# Patient Record
Sex: FEMALE | Race: BLACK OR AFRICAN AMERICAN | Employment: OTHER | URBAN - METROPOLITAN AREA
[De-identification: names, ages, dates, MRNs, and addresses within clinical notes are randomized per-mention and may not be internally consistent; named-entity substitution may affect disease eponyms.]

---

## 2017-03-17 ENCOUNTER — HOSPITAL ENCOUNTER (OUTPATIENT)
Dept: MAMMOGRAPHY | Age: 67
Discharge: HOME | End: 2017-03-17
Attending: FAMILY MEDICINE
Payer: COMMERCIAL

## 2017-03-17 DIAGNOSIS — Z12.31 SCREENING MAMMOGRAM, ENCOUNTER FOR: ICD-10-CM

## 2017-03-17 PROCEDURE — 77067 SCR MAMMO BI INCL CAD: CPT

## 2017-04-10 ENCOUNTER — HOSPITAL ENCOUNTER (EMERGENCY)
Age: 67
Discharge: HOME | End: 2017-04-10
Payer: COMMERCIAL

## 2017-04-10 ENCOUNTER — APPOINTMENT (OUTPATIENT)
Dept: GENERAL RADIOLOGY | Age: 67
End: 2017-04-10
Payer: COMMERCIAL

## 2017-04-10 ENCOUNTER — APPOINTMENT (OUTPATIENT)
Dept: CT IMAGING | Age: 67
End: 2017-04-10
Payer: COMMERCIAL

## 2017-04-10 VITALS
BODY MASS INDEX: 31.39 KG/M2 | HEART RATE: 96 BPM | WEIGHT: 200 LBS | OXYGEN SATURATION: 96 % | HEIGHT: 67 IN | DIASTOLIC BLOOD PRESSURE: 62 MMHG | SYSTOLIC BLOOD PRESSURE: 112 MMHG | RESPIRATION RATE: 18 BRPM

## 2017-04-10 DIAGNOSIS — R42 VERTIGO: Primary | ICD-10-CM

## 2017-04-10 LAB
ALBUMIN SERPL BCP-MCNC: 3.5 G/DL (ref 3.2–4.6)
ALBUMIN/GLOB SERPL: 0.9 {RATIO} (ref 1.2–3.5)
ALP SERPL-CCNC: 106 U/L (ref 50–136)
ALT SERPL-CCNC: 28 U/L (ref 12–65)
ANION GAP BLD CALC-SCNC: 11 MMOL/L (ref 7–16)
AST SERPL W P-5'-P-CCNC: 21 U/L (ref 15–37)
ATRIAL RATE: 73 BPM
BASOPHILS # BLD AUTO: 0 K/UL (ref 0–0.2)
BASOPHILS # BLD: 0 % (ref 0–2)
BILIRUB SERPL-MCNC: 0.3 MG/DL (ref 0.2–1.1)
BUN SERPL-MCNC: 8 MG/DL (ref 8–23)
CALCIUM SERPL-MCNC: 9.4 MG/DL (ref 8.3–10.4)
CALCULATED P AXIS, ECG09: 61 DEGREES
CALCULATED R AXIS, ECG10: 47 DEGREES
CALCULATED T AXIS, ECG11: 67 DEGREES
CHLORIDE SERPL-SCNC: 105 MMOL/L (ref 98–107)
CO2 SERPL-SCNC: 27 MMOL/L (ref 21–32)
CREAT SERPL-MCNC: 1.01 MG/DL (ref 0.6–1)
DIAGNOSIS, 93000: NORMAL
DIFFERENTIAL METHOD BLD: ABNORMAL
EOSINOPHIL # BLD: 0.1 K/UL (ref 0–0.8)
EOSINOPHIL NFR BLD: 1 % (ref 0.5–7.8)
ERYTHROCYTE [DISTWIDTH] IN BLOOD BY AUTOMATED COUNT: 13.6 % (ref 11.9–14.6)
GLOBULIN SER CALC-MCNC: 3.9 G/DL (ref 2.3–3.5)
GLUCOSE SERPL-MCNC: 213 MG/DL (ref 65–100)
HCT VFR BLD AUTO: 42.4 % (ref 35.8–46.3)
HGB BLD-MCNC: 13.4 G/DL (ref 11.7–15.4)
IMM GRANULOCYTES # BLD: 0 K/UL (ref 0–0.5)
IMM GRANULOCYTES NFR BLD AUTO: 0.4 % (ref 0–5)
LYMPHOCYTES # BLD AUTO: 37 % (ref 13–44)
LYMPHOCYTES # BLD: 2.9 K/UL (ref 0.5–4.6)
MAGNESIUM SERPL-MCNC: 1.8 MG/DL (ref 1.8–2.4)
MCH RBC QN AUTO: 29.9 PG (ref 26.1–32.9)
MCHC RBC AUTO-ENTMCNC: 31.6 G/DL (ref 31.4–35)
MCV RBC AUTO: 94.6 FL (ref 79.6–97.8)
MONOCYTES # BLD: 0.4 K/UL (ref 0.1–1.3)
MONOCYTES NFR BLD AUTO: 6 % (ref 4–12)
NEUTS SEG # BLD: 4.2 K/UL (ref 1.7–8.2)
NEUTS SEG NFR BLD AUTO: 56 % (ref 43–78)
P-R INTERVAL, ECG05: 146 MS
PLATELET # BLD AUTO: 223 K/UL (ref 150–450)
PMV BLD AUTO: 10.2 FL (ref 10.8–14.1)
POTASSIUM SERPL-SCNC: 3 MMOL/L (ref 3.5–5.1)
PROT SERPL-MCNC: 7.4 G/DL (ref 6.3–8.2)
Q-T INTERVAL, ECG07: 368 MS
QRS DURATION, ECG06: 80 MS
QTC CALCULATION (BEZET), ECG08: 405 MS
RBC # BLD AUTO: 4.48 M/UL (ref 4.05–5.25)
SODIUM SERPL-SCNC: 143 MMOL/L (ref 136–145)
VENTRICULAR RATE, ECG03: 73 BPM
WBC # BLD AUTO: 7.6 K/UL (ref 4.3–11.1)

## 2017-04-10 PROCEDURE — 80053 COMPREHEN METABOLIC PANEL: CPT

## 2017-04-10 PROCEDURE — 83735 ASSAY OF MAGNESIUM: CPT

## 2017-04-10 PROCEDURE — 70450 CT HEAD/BRAIN W/O DYE: CPT

## 2017-04-10 PROCEDURE — 96374 THER/PROPH/DIAG INJ IV PUSH: CPT

## 2017-04-10 PROCEDURE — 71010 XR CHEST PORT: CPT

## 2017-04-10 PROCEDURE — 93005 ELECTROCARDIOGRAM TRACING: CPT

## 2017-04-10 PROCEDURE — 99283 EMERGENCY DEPT VISIT LOW MDM: CPT

## 2017-04-10 PROCEDURE — 74011250636 HC RX REV CODE- 250/636

## 2017-04-10 PROCEDURE — 85025 COMPLETE CBC W/AUTO DIFF WBC: CPT

## 2017-04-10 RX ORDER — MECLIZINE HYDROCHLORIDE 25 MG/1
25 TABLET ORAL
Qty: 21 TAB | Refills: 0 | Status: SHIPPED | OUTPATIENT
Start: 2017-04-10 | End: 2017-04-20

## 2017-04-10 RX ORDER — DIAZEPAM 10 MG/2ML
5 INJECTION INTRAMUSCULAR
Status: DISCONTINUED | OUTPATIENT
Start: 2017-04-10 | End: 2017-04-10 | Stop reason: HOSPADM

## 2017-04-10 RX ORDER — MECLIZINE HYDROCHLORIDE 25 MG/1
50 TABLET ORAL
Status: COMPLETED | OUTPATIENT
Start: 2017-04-10 | End: 2017-04-10

## 2017-04-10 RX ADMIN — MECLIZINE HYDROCHLORIDE 50 MG: 25 TABLET ORAL at 01:43

## 2017-04-10 NOTE — ED PROVIDER NOTES
HPI Comments: 79-year-old female complaining of vertigo. Patient has a history of vertigo. Has had an episode for a while does not have any Antivert available. Patient is a 74 y.o. female presenting with dizziness. The history is provided by the patient. Dizziness   This is a recurrent problem. The current episode started 12 to 24 hours ago. The problem has not changed since onset. There was no focality noted. Pertinent negatives include no focal weakness, no agitation, no visual change, no mental status change, no unresponsiveness and no disorientation. There has been no fever. Associated symptoms include nausea. Pertinent negatives include no altered mental status, no confusion and no headaches. Associated medical issues do not include trauma or CVA. 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    Hx of abnormal mammogram     Hx of Doppler ultrasound     AAA, carotid normal 2014    Hx of echocardiogram 2014    Inverted nipple        Past Surgical History:   Procedure Laterality Date    HX HEMORRHOIDECTOMY      HX KNEE ARTHROSCOPY      HX TUBAL LIGATION           Family History:   Problem Relation Age of Onset    Hypertension Mother     Cancer Mother      Lung Cancer    Heart Disease Father     Hypertension Father     Breast Cancer Paternal Aunt     Breast Cancer Other        Social History     Social History    Marital status:      Spouse name: N/A    Number of children: 2    Years of education: N/A     Occupational History    Not on file.      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 Negative for activity change. HENT: Negative. Eyes: Negative. Respiratory: Negative. Cardiovascular: Negative. Gastrointestinal: Positive for nausea. Genitourinary: Negative. Musculoskeletal: Negative. Skin: Negative. Neurological: Positive for dizziness. Negative for focal weakness and headaches. Psychiatric/Behavioral: Negative. Negative for agitation and confusion. All other systems reviewed and are negative. Vitals:    04/10/17 0048 04/10/17 0050 04/10/17 0233   BP:  146/75 112/62   Pulse: 96     Resp: 18     SpO2: 96%  96%   Weight: 90.7 kg (200 lb)     Height: 5' 7\" (1.702 m)              Physical Exam   Constitutional: She is oriented to person, place, and time. She appears well-developed and well-nourished. No distress. HENT:   Head: Normocephalic and atraumatic. Right Ear: External ear normal.   Left Ear: External ear normal.   Nose: Nose normal.   Mouth/Throat: Oropharynx is clear and moist. No oropharyngeal exudate. Eyes: Conjunctivae are normal. Pupils are equal, round, and reactive to light. Right eye exhibits no discharge. Left eye exhibits no discharge. No scleral icterus. Right eye exhibits nystagmus. Left eye exhibits nystagmus. Neck: Normal range of motion. Neck supple. No JVD present. No tracheal deviation present. Cardiovascular: Normal rate, regular rhythm and intact distal pulses. Pulmonary/Chest: Effort normal and breath sounds normal. No stridor. No respiratory distress. She has no wheezes. She exhibits no tenderness. Abdominal: Soft. Bowel sounds are normal. She exhibits no distension and no mass. There is no tenderness. Musculoskeletal: Normal range of motion. She exhibits no edema or tenderness. Neurological: She is alert and oriented to person, place, and time. No cranial nerve deficit. Skin: Skin is warm and dry. No rash noted. She is not diaphoretic. No erythema.  No pallor. Psychiatric: She has a normal mood and affect. Her behavior is normal. Thought content normal.   Nursing note and vitals reviewed. MDM  Number of Diagnoses or Management Options  Vertigo: established and worsening  Diagnosis management comments: History of vertigo completely resolved with 50 of Antivert. We'll put her on Antivert follow-up with primary care physician this week.        Amount and/or Complexity of Data Reviewed  Clinical lab tests: ordered and reviewed  Tests in the radiology section of CPT®: ordered and reviewed  Tests in the medicine section of CPT®: ordered and reviewed      ED Course       Procedures

## 2017-04-10 NOTE — DISCHARGE INSTRUCTIONS
Dizziness: Care Instructions  Your Care Instructions  Dizziness is the feeling of unsteadiness or fuzziness in your head. It is different than having vertigo, which is a feeling that the room is spinning or that you are moving or falling. It is also different from lightheadedness, which is the feeling that you are about to faint. It can be hard to know what causes dizziness. Some people feel dizzy when they have migraine headaches. Sometimes bouts of flu can make you feel dizzy. Some medical conditions, such as heart problems or high blood pressure, can make you feel dizzy. Many medicines can cause dizziness, including medicines for high blood pressure, pain, or anxiety. If a medicine causes your symptoms, your doctor may recommend that you stop or change the medicine. If it is a problem with your heart, you may need medicine to help your heart work better. If there is no clear reason for your symptoms, your doctor may suggest watching and waiting for a while to see if the dizziness goes away on its own. Follow-up care is a key part of your treatment and safety. Be sure to make and go to all appointments, and call your doctor if you are having problems. It's also a good idea to know your test results and keep a list of the medicines you take. How can you care for yourself at home? · If your doctor recommends or prescribes medicine, take it exactly as directed. Call your doctor if you think you are having a problem with your medicine. · Do not drive while you feel dizzy. · Try to prevent falls. Steps you can take include:  ¨ Using nonskid mats, adding grab bars near the tub, and using night-lights. ¨ Clearing your home so that walkways are free of anything you might trip on. ¨ Letting family and friends know that you have been feeling dizzy. This will help them know how to help you. When should you call for help? Call 911 anytime you think you may need emergency care.  For example, call if:  · You passed out (lost consciousness). · You have dizziness along with symptoms of a heart attack. These may include:  ¨ Chest pain or pressure, or a strange feeling in the chest.  ¨ Sweating. ¨ Shortness of breath. ¨ Nausea or vomiting. ¨ Pain, pressure, or a strange feeling in the back, neck, jaw, or upper belly or in one or both shoulders or arms. ¨ Lightheadedness or sudden weakness. ¨ A fast or irregular heartbeat. · You have symptoms of a stroke. These may include:  ¨ Sudden numbness, tingling, weakness, or loss of movement in your face, arm, or leg, especially on only one side of your body. ¨ Sudden vision changes. ¨ Sudden trouble speaking. ¨ Sudden confusion or trouble understanding simple statements. ¨ Sudden problems with walking or balance. ¨ A sudden, severe headache that is different from past headaches. Call your doctor now or seek immediate medical care if:  · You feel dizzy and have a fever, headache, or ringing in your ears. · You have new or increased nausea and vomiting. · Your dizziness does not go away or comes back. Watch closely for changes in your health, and be sure to contact your doctor if:  · You do not get better as expected. Where can you learn more? Go to http://fabiola-stacy.info/. Enter L574 in the search box to learn more about \"Dizziness: Care Instructions. \"  Current as of: May 27, 2016  Content Version: 11.2  © 4741-7802 [employer]. Care instructions adapted under license by [employer] (which disclaims liability or warranty for this information). If you have questions about a medical condition or this instruction, always ask your healthcare professional. [place] [address] any warranty or liability for your use of this information. Vertigo: Care Instructions  Your Care Instructions  Vertigo is the feeling that you or your surroundings are moving when there is no actual movement.  It is often described as a feeling of spinning, whirling, falling, or tilting. Vertigo may make you vomit or feel nauseated. You may have trouble standing or walking and may lose your balance. Vertigo is often related to an inner ear problem, but it can have other more serious causes. If vertigo continues, you may need more tests to find its cause. Follow-up care is a key part of your treatment and safety. Be sure to make and go to all appointments, and call your doctor if you are having problems. Its also a good idea to know your test results and keep a list of the medicines you take. How can you care for yourself at home? · Do not lie flat on your back. Prop yourself up slightly. This may reduce the spinning feeling. Keep your eyes open. · Move slowly so that you do not fall. · If your doctor recommends medicine, take it exactly as directed. · Do not drive while you are having vertigo. Certain exercises, called Coombs-Daroff exercises, can help decrease vertigo. To do Coombs-Daroff exercises:  · Sit on the edge of a bed or sofa and quickly lie down on the side that causes the worst vertigo. Lie on your side with your ear down. · Stay in this position for at least 30 seconds or until the vertigo goes away. · Sit up. If this causes vertigo, wait for it to stop. · Repeat the procedure on the other side. · Repeat this 10 times. Do these exercises 2 times a day until the vertigo is gone. When should you call for help? Call 911 anytime you think you may need emergency care. For example, call if:  · You passed out (lost consciousness). · You have symptoms of a stroke. These may include:  ¨ Sudden numbness, tingling, weakness, or loss of movement in your face, arm, or leg, especially on only one side of your body. ¨ Sudden vision changes. ¨ Sudden trouble speaking. ¨ Sudden confusion or trouble understanding simple statements. ¨ Sudden problems with walking or balance.   ¨ A sudden, severe headache that is different from past headaches. Call your doctor now or seek immediate medical care if:  · Vertigo occurs with a fever, a headache, or ringing in your ears. · You have new or increased nausea and vomiting. Watch closely for changes in your health, and be sure to contact your doctor if:  · Vertigo gets worse or happens more often. · Vertigo has not gotten better after 2 weeks. Where can you learn more? Go to http://fabiola-stacy.info/. Enter T450 in the search box to learn more about \"Vertigo: Care Instructions. \"  Current as of: July 29, 2016  Content Version: 11.2  © 4899-9966 [employer]. Care instructions adapted under license by [employer] (which disclaims liability or warranty for this information). If you have questions about a medical condition or this instruction, always ask your healthcare professional. [address] any warranty or liability for your use of this information.

## 2017-04-11 ENCOUNTER — HOSPITAL ENCOUNTER (OUTPATIENT)
Dept: PHYSICAL THERAPY | Age: 67
End: 2017-04-11

## 2017-04-25 ENCOUNTER — HOSPITAL ENCOUNTER (OUTPATIENT)
Dept: PHYSICAL THERAPY | Age: 67
Discharge: HOME | End: 2017-04-25
Payer: MEDICARE

## 2017-04-25 DIAGNOSIS — M53.3 SACRO ILIAL PAIN: ICD-10-CM

## 2017-04-25 PROCEDURE — G8981 BODY POS CURRENT STATUS: HCPCS

## 2017-04-25 PROCEDURE — 97161 PT EVAL LOW COMPLEX 20 MIN: CPT

## 2017-04-25 PROCEDURE — G8982 BODY POS GOAL STATUS: HCPCS

## 2017-04-25 NOTE — PROGRESS NOTES
Ambulatory/Rehab Services H2 Model Falls Risk Assessment    Risk Factor Pts. ·   Confusion/Disorientation/Impulsivity  []    4 ·   Symptomatic Depression  []   2 ·   Altered Elimination  []   1 ·   Dizziness/Vertigo  []   1 ·   Gender (Male)  []   1 ·   Any administered antiepileptics (anticonvulsants):  []   2 ·   Any administered benzodiazepines:  []   1 ·   Visual Impairment (specify):  []   1 ·   Portable Oxygen Use  []   1 ·   Orthostatic ? BP  []   1 ·   History of Recent Falls (within 3 mos.)  []   5     Ability to Rise from Chair (choose one) Pts. ·   Ability to rise in a single movement  [x]   0 ·   Pushes up, successful in one attempt  []   1 ·   Multiple attempts, but successful  []   3 ·   Unable to rise without assistance  []   4   Total: (5 or greater = High Risk) 0     Falls Prevention Plan:   []                Physical Limitations to Exercise (specify):   []                Mobility Assistance Device (type):   []                Exercise/Equipment Adaptation (specify):    ©2010 [facility] [address] Patent #[id number].  Federal Law prohibits the replication, distribution or use without written permission from [facility] [employer]

## 2017-04-25 NOTE — PROGRESS NOTES
Libby Lowery  : 1950 [facility]  [address] [facility], [address] [place] [address],[address], [address] [facility]  [phone]   [phone]         OUTPATIENT PHYSICAL THERAPY:Initial Assessment 2017    ICD-10: Treatment Diagnosis: Low back pain (M54.5),  Sacro ilial pain (M53.3)  Precautions/Allergies: allergic to sulfa antibiotics  Fall Risk Score: 0 (? 5 = High Risk)  MD Orders: Evaluate and treat SI joint pain MEDICAL/REFERRING DIAGNOSIS:  Sacro ilial pain [M53.3]    DATE OF ONSET: over a year ago  REFERRING PHYSICIAN: Thien Velazquez MD  RETURN PHYSICIAN APPOINTMENT: None scheduled     INITIAL ASSESSMENT:  Ms. Lorna Villa is a 74year old female with complaints of pain in the L SI area that started over a year ago. She presents with constant pain which does not change much with position, decreased lumbar motion, and positive SI provocation tests. She may benefit from PT to address deficits and work toward goals. As pain has been present for over a year, recovery may be slow. PROBLEM LIST (Impacting functional limitations):  1. Increased Pain  2. Decreased Flexibility/Joint Mobility  3. SI dysfunction INTERVENTIONS PLANNED:  1. Home Exercise Program (HEP)  2. Manual Therapy  3. Therapeutic Exercise/Strengthening  4. Modals as needed   TREATMENT PLAN:  Effective Dates: 17 TO 17. Frequency/Duration: 1-2 times a week for 90 days  GOALS: (Goals have been discussed and agreed upon with patient.)  Patient's stated goal is to stop having pain  Short-Term Functional Goals: Time Frame: 6 weeks  1. Patient to be independent with HEP  2. Patient to report decrease in constant pain levels from 6 or 7, to 3 or 4  Discharge Goals: Time Frame: 90 days  1. Patient to report no more than minimal SI pain with all activity  2.  Patient to improve Oswestry score from 18 to 10 to demo improved functional mobility  Rehabilitation Potential For Stated Goals: Mariangel Amado Pool Clay's therapy, I certify that the treatment plan above will be carried out by a therapist or under their direction. Thank you for this referral,      Celsa Romero PT     Referring Physician Signature: Hetal Aldrich MD              Date                    The information in this section was collected on 4/25/17 (except where otherwise noted). HISTORY:   History of Present Injury/Illness (Reason for Referral): Insidious onset of pain in L SI area over a year ago. Sometimes pain goes across abdomen, but not always. Walking does not increase pain. Can sit in firm chair, but not soft ones. Pain may be getting slightly worse since it first started. 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 Current Medications:  Calcium with vitamin D  Date Last Reviewed:  4/25/17   Number of Personal Factors/Comorbidities that affect the Plan of Care: 0: LOW COMPLEXITY   EXAMINATION:   Observation/Orthostatic Postural Assessment:          Stands with increased lumbar lordosis. Palpation:         Pelvic land marks symmetrical in standing. ROM:          Lumbar extension, L and R rotation all 100%. Lumbar flexion about 50%, lateral flexion 75% to L and R  Strength:          WFL  Special Tests:          Positive standing forward bend test on L. Positive Carlton on L. Positive SI compression on L. Neurological Screen:        Sensation: light touch intact in B LEs   Body Structures Involved:  1. Joints  2. Muscles  3. Ligaments Body Functions Affected:  1. Neuromusculoskeletal  2. Movement Related Activities and Participation Affected:  1. Mobility  2. Self Care  3.  Community, Social and [place]   Number of elements (examined above) that affect the Plan of Care: 3: MODERATE COMPLEXITY   CLINICAL PRESENTATION:   Presentation: Stable and uncomplicated: LOW COMPLEXITY   CLINICAL DECISION MAKING:   Outcome Measure: Tool Used: Modified Oswestry Low Back Pain Questionnaire  Score:  Initial: 18/50  Most Recent: X/50 (Date: -- )   Interpretation of Score: Each section is scored on a 0-5 scale, 5 representing the greatest disability. The scores of each section are added together for a total score of 50. Score 0 1-10 11-20 21-30 31-40 41-49 50   Modifier CH CI CJ CK CL CM CN     ? Changing and Maintaining Body Position:    [id number] - CURRENT STATUS: CJ - 20%-39% impaired, limited or restricted    - GOAL STATUS: CI - 1%-19% impaired, limited or restricted    - D/C STATUS:  ---------------To be determined---------------    Medical Necessity:   · Patient demonstrates fair rehab potential due to higher previous functional level. Reason for Services/Other Comments:  · Patient continues to require skilled intervention due to need to return to higher level of function. Use of outcome tool(s) and clinical judgement create a POC that gives a: Questionable prediction of patient's progress: MODERATE COMPLEXITY            TREATMENT:   (In addition to Assessment/Re-Assessment sessions the following treatments were rendered)  Pre-treatment Symptoms/Complaints:  Pain which has lasted for over a year. Pain: Initial:   Pain Intensity 1: 7 (\"6 to 7\")   Post Session:  Patient noted that pain was slightly less at end of session, but she did not quantify the level. Therapeutic Exercise: ( ):  5 min - Exercises to improve mobility and posture. Required moderate visual and verbal cues to instruct in exercises. Progressed complexity of movement as indicated. Instructed patient in [address], standing posterior pelvic tilt, and SI joint stretch (knee to contralateral shoulder). Modalities - Ice x 5 minutes to SI area while PT printed HEP. She then took ice with her.      Treatment/Session Assessment:    · Response to Treatment:  Patient noted feeling slightly better after icing and she plans to try this at home as recommended by PT.  · Compliance with Program/Exercises: Will assess as treatment progresses. · Recommendations/Intent for next treatment session: \"Next visit will focus on decreasing pain, mobilizing SI\".   Total Treatment Duration: 57 minutes  PT Patient Time In/Time Out  Time In: 1300  Time Out: [address] Neumann

## 2017-05-02 ENCOUNTER — HOSPITAL ENCOUNTER (OUTPATIENT)
Dept: PHYSICAL THERAPY | Age: 67
Discharge: HOME | End: 2017-05-02
Payer: MEDICARE

## 2017-05-02 PROCEDURE — 97110 THERAPEUTIC EXERCISES: CPT

## 2017-05-02 NOTE — PROGRESS NOTES
Sunny Crocker  : 1950 [facility]  1101 [facility], [address] [place] [address],[address], [facility] [address] [facility].  [phone]   [phone]         OUTPATIENT PHYSICAL THERAPY:Daily Note 2017    ICD-10: Treatment Diagnosis: Low back pain (M54.5),  Sacro ilial pain (M53.3)  Precautions/Allergies: allergic to sulfa antibiotics  Fall Risk Score: 0 (? 5 = High Risk)  MD Orders: Evaluate and treat SI joint pain MEDICAL/REFERRING DIAGNOSIS:  sacro ilial pain    DATE OF ONSET: over a year ago  REFERRING PHYSICIAN: Ricky Escobar MD  RETURN PHYSICIAN APPOINTMENT: None scheduled     INITIAL ASSESSMENT:  Ms. Yee Wilson is a 74year old female with complaints of pain in the L SI area that started over a year ago. She presents with constant pain which does not change much with position, decreased lumbar motion, and positive SI provocation tests. She may benefit from PT to address deficits and work toward goals. As pain has been present for over a year, recovery may be slow. PROBLEM LIST (Impacting functional limitations):  1. Increased Pain  2. Decreased Flexibility/Joint Mobility  3. SI dysfunction INTERVENTIONS PLANNED:  1. Home Exercise Program (HEP)  2. Manual Therapy  3. Therapeutic Exercise/Strengthening  4. Modals as needed   TREATMENT PLAN:  Effective Dates: 17 TO 17. Frequency/Duration: 1-2 times a week for 90 days  GOALS: (Goals have been discussed and agreed upon with patient.)  Patient's stated goal is to stop having pain  Short-Term Functional Goals: Time Frame: 6 weeks  1. Patient to be independent with HEP  2. Patient to report decrease in constant pain levels from 6 or 7, to 3 or 4  Discharge Goals: Time Frame: 90 days  1. Patient to report no more than minimal SI pain with all activity  2.  Patient to improve Oswestry score from 18 to 10 to demo improved functional mobility  Rehabilitation Potential For Stated Goals: [address] [place] [address],  The information in this section was collected on 4/25/17 (except where otherwise noted). HISTORY:   History of Present Injury/Illness (Reason for Referral): Insidious onset of pain in L SI area over a year ago. Sometimes pain goes across abdomen, but not always. Walking does not increase pain. Can sit in firm chair, but not soft ones. Pain may be getting slightly worse since it first started. 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 Current Medications:  Calcium with vitamin D  Date Last Reviewed:  5/2/17   EXAMINATION:   Observation/Orthostatic Postural Assessment:          Stands with increased lumbar lordosis. Palpation:         Pelvic land marks symmetrical in standing. ROM:          Lumbar extension, L and R rotation all 100%. Lumbar flexion about 50%, lateral flexion 75% to L and R  Strength:          WFL  Special Tests:          Positive standing forward bend test on L. Positive Carlton on L. Positive SI compression on L. Neurological Screen:        Sensation: light touch intact in B LEs   Body Structures Involved:  1. Joints  2. Muscles  3. Ligaments Body Functions Affected:  1. Neuromusculoskeletal  2. Movement Related Activities and Participation Affected:  1. Mobility  2. Self Care  3. Community, Social and Civic Life   CLINICAL DECISION MAKING:   Outcome Measure: Tool Used: Modified Oswestry Low Back Pain Questionnaire  Score:  Initial: 18/50  Most Recent: X/50 (Date: -- )   Interpretation of Score: Each section is scored on a 0-5 scale, 5 representing the greatest disability. The scores of each section are added together for a total score of 50. Score 0 1-10 11-20 21-30 31-40 41-49 50   Modifier CH CI CJ CK CL CM CN     ?  Changing and Maintaining Body Position:    [id number] - CURRENT STATUS: CJ - 20%-39% impaired, limited or restricted    - GOAL STATUS: CI - 1%-19% impaired, limited or restricted    - D/C STATUS:  ---------------To be determined---------------    Medical Necessity:   · Patient demonstrates fair rehab potential due to higher previous functional level. Reason for Services/Other Comments:  · Patient continues to require skilled intervention due to need to return to higher level of function. TREATMENT:   (In addition to Assessment/Re-Assessment sessions the following treatments were rendered)  Pre-treatment Symptoms/Complaints:  Patient reports that she is significantly better. Icing and stretching have helped. Reports compliance with HEP and she has also tried to avoid sitting with her legs crossed. Pain: Initial:   Pain Intensity 1: 3   Post Session:  Patient noted that pain was slightly more at end of session, but she did not quantify the level. She will ice at home. Therapeutic Exercise: (25 Minutes):   Exercises to improve mobility and posture. Required moderate visual and verbal cues to ensure correct performance. Progressed complexity of movement as indicated. Date:  5/2/17 Date:   Date:     Activity/Exercise Parameters Parameters Parameters   SKTC B x 10      SI joint stretch B x 10     DKTC x5     Supine pelvic tilt x10     bridging 1x10     Trunk flex with ball 1x10     Standing pelvic tilt x10     Hip abductor squeeze 5 sec x 10                           Modalities - patient declined ice - will do at home. Treatment/Session Assessment:    · Response to Treatment:  Patient noted being a little more sore after exercises, but better than she was last week. · Compliance with Program/Exercises: yes  · Recommendations/Intent for next treatment session: \"Next visit will focus on decreasing pain, mobilizing SI\".   Total Treatment Duration: 25 minutes  PT Patient Time In/Time Out  Time In: 0820  Time Out: [facility] , PT

## 2017-05-08 ENCOUNTER — HOSPITAL ENCOUNTER (OUTPATIENT)
Dept: PHYSICAL THERAPY | Age: 67
Discharge: HOME | End: 2017-05-08
Payer: MEDICARE

## 2017-05-08 PROCEDURE — 97110 THERAPEUTIC EXERCISES: CPT

## 2017-05-08 NOTE — PROGRESS NOTES
Delora Dance  : 1950 Therapy Center at [facility] BR[facility]NA [facility]  1101 [facility], [address] [place] [address],[address], [place] [address].  [phone]   [phone]         OUTPATIENT PHYSICAL THERAPY:Daily Note 2017    ICD-10: Treatment Diagnosis: Low back pain (M54.5),  Sacro ilial pain (M53.3)  Precautions/Allergies: allergic to sulfa antibiotics  Fall Risk Score: 0 (? 5 = High Risk)  MD Orders: Evaluate and treat SI joint pain MEDICAL/REFERRING DIAGNOSIS:  sacro ilial pain    DATE OF ONSET: over a year ago  REFERRING PHYSICIAN: Bing Cisse MD  RETURN PHYSICIAN APPOINTMENT: None scheduled     INITIAL ASSESSMENT:  Ms. Cassie Monroe is a 74year old female with complaints of pain in the L SI area that started over a year ago. She presents with constant pain which does not change much with position, decreased lumbar motion, and positive SI provocation tests. She may benefit from PT to address deficits and work toward goals. As pain has been present for over a year, recovery may be slow. PROBLEM LIST (Impacting functional limitations):  1. Increased Pain  2. Decreased Flexibility/Joint Mobility  3. SI dysfunction INTERVENTIONS PLANNED:  1. Home Exercise Program (HEP)  2. Manual Therapy  3. Therapeutic Exercise/Strengthening  4. Modals as needed   TREATMENT PLAN:  Effective Dates: 17 TO 17. Frequency/Duration: 1-2 times a week for 90 days  GOALS: (Goals have been discussed and agreed upon with patient.)  Patient's stated goal is to stop having pain  Short-Term Functional Goals: Time Frame: 6 weeks  1. Patient to be independent with HEP  2. Patient to report decrease in constant pain levels from 6 or 7, to 3 or 4  Discharge Goals: Time Frame: 90 days  1. Patient to report no more than minimal SI pain with all activity  2.  Patient to improve Oswestry score from 18 to 10 to demo improved functional mobility  Rehabilitation Potential For Stated Goals: [address] [place] [address],  The information in this section was collected on 4/25/17 (except where otherwise noted). HISTORY:   History of Present Injury/Illness (Reason for Referral): Insidious onset of pain in L SI area over a year ago. Sometimes pain goes across abdomen, but not always. Walking does not increase pain. Can sit in firm chair, but not soft ones. Pain may be getting slightly worse since it first started. 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 Current Medications:  Calcium with vitamin D  Date Last Reviewed:  5/8/17   EXAMINATION:   Observation/Orthostatic Postural Assessment:          Stands with increased lumbar lordosis. Palpation:         Pelvic land marks symmetrical in standing. ROM:          Lumbar extension, L and R rotation all 100%. Lumbar flexion about 50%, lateral flexion 75% to L and R  Strength:          WFL  Special Tests:          Positive standing forward bend test on L. Positive Carlton on L. Positive SI compression on L. Neurological Screen:        Sensation: light touch intact in B LEs   Body Structures Involved:  1. Joints  2. Muscles  3. Ligaments Body Functions Affected:  1. Neuromusculoskeletal  2. Movement Related Activities and Participation Affected:  1. Mobility  2. Self Care  3. Community, Social and Civic Life   CLINICAL DECISION MAKING:   Outcome Measure: Tool Used: Modified Oswestry Low Back Pain Questionnaire  Score:  Initial: 18/50  Most Recent: X/50 (Date: -- )   Interpretation of Score: Each section is scored on a 0-5 scale, 5 representing the greatest disability. The scores of each section are added together for a total score of 50. Score 0 1-10 11-20 21-30 31-40 41-49 50   Modifier CH CI CJ CK CL CM CN     ?  Changing and Maintaining Body Position:    [id number] - CURRENT STATUS: CJ - 20%-39% impaired, limited or restricted    - GOAL STATUS: CI - 1%-19% impaired, limited or restricted    - D/C STATUS:  ---------------To be determined---------------    Medical Necessity:   · Patient demonstrates fair rehab potential due to higher previous functional level. Reason for Services/Other Comments:  · Patient continues to require skilled intervention due to need to return to higher level of function. TREATMENT:   (In addition to Assessment/Re-Assessment sessions the following treatments were rendered)  Pre-treatment Symptoms/Complaints:  Patient reports that she was dancing over the weekend and her back locked up in the middle of one of the dances (\"[employer] Jerk\"). She is still doing better than she was initially. Thinks that icing is very helpful. Pain: Initial:   Pain Intensity 1: 4 (Between a 3 and a 4)   Post Session:  No change in pain noted at end of session. Therapeutic Exercise: (27 Minutes):   Exercises to improve mobility and posture. Required moderate visual and verbal cues to ensure correct performance. Progressed complexity of movement as indicated. Date:  5/2/17 Date:  5/8/17 Date:     Activity/Exercise Parameters Parameters Parameters   SKTC B x 10  B x 10    SI joint stretch B x 10 B x 10    DKTC x5 x5    Supine pelvic tilt x10 1 x10    bridging 1x10 1x10    Trunk flex with ball 1x10 2x10    Standing pelvic tilt x10 1x10    Hip abductor squeeze 5 sec x 10 5 sec x 10    scap retract with band  Green x 15    B shldr extn with band  Green x 15    Seated forward bend - stretch  X 1 only        Modalities - none today      Treatment/Session Assessment:    · Response to Treatment:  Patient did well with all exercises despite being a little sore from dancing over the weekend. [place] agreement to decrease frequency to 1x per week. · Compliance with Program/Exercises: yes  · Recommendations/Intent for next treatment session: \"Next visit will focus on decreasing pain \".   Total Treatment Duration: 27 minutes  PT Patient Time In/Time Out  Time In: 1105  Time Out: [address]

## 2017-05-10 ENCOUNTER — APPOINTMENT (OUTPATIENT)
Dept: PHYSICAL THERAPY | Age: 67
End: 2017-05-10
Payer: MEDICARE

## 2017-05-15 ENCOUNTER — HOSPITAL ENCOUNTER (OUTPATIENT)
Dept: PHYSICAL THERAPY | Age: 67
Discharge: HOME | End: 2017-05-15
Payer: MEDICARE

## 2017-05-15 PROCEDURE — 97110 THERAPEUTIC EXERCISES: CPT

## 2017-05-17 ENCOUNTER — APPOINTMENT (OUTPATIENT)
Dept: PHYSICAL THERAPY | Age: 67
End: 2017-05-17
Payer: MEDICARE

## 2017-05-22 ENCOUNTER — HOSPITAL ENCOUNTER (OUTPATIENT)
Dept: PHYSICAL THERAPY | Age: 67
Discharge: HOME | End: 2017-05-22
Payer: MEDICARE

## 2017-05-22 PROCEDURE — G8980 MOBILITY D/C STATUS: HCPCS

## 2017-05-22 PROCEDURE — 97110 THERAPEUTIC EXERCISES: CPT

## 2017-05-22 PROCEDURE — G8979 MOBILITY GOAL STATUS: HCPCS

## 2017-05-24 ENCOUNTER — APPOINTMENT (OUTPATIENT)
Dept: PHYSICAL THERAPY | Age: 67
End: 2017-05-24
Payer: MEDICARE

## 2018-03-13 PROBLEM — Z13.1 SCREENING FOR DIABETES MELLITUS: Status: ACTIVE | Noted: 2018-03-13

## 2018-03-13 PROBLEM — Z13.6 SCREENING, ISCHEMIC HEART DISEASE: Status: ACTIVE | Noted: 2018-03-13

## 2018-03-23 ENCOUNTER — HOSPITAL ENCOUNTER (OUTPATIENT)
Dept: MAMMOGRAPHY | Age: 68
Discharge: HOME | End: 2018-03-23
Attending: FAMILY MEDICINE
Payer: MEDICARE

## 2018-03-23 DIAGNOSIS — Z12.31 ENCOUNTER FOR SCREENING MAMMOGRAM FOR MALIGNANT NEOPLASM OF BREAST: ICD-10-CM

## 2018-03-23 PROCEDURE — 77067 SCR MAMMO BI INCL CAD: CPT

## 2019-09-03 NOTE — PROGRESS NOTES
Libby Lowery  : 1950 Therapy Center at [facility] BR[facility]NA LUQUE  1101 [facility], [address] [place] [address] 83,[address], [address] [facility]  [phone]   [phone]         OUTPATIENT PHYSICAL THERAPY:Daily Note 5/15/2017    ICD-10: Treatment Diagnosis: Low back pain (M54.5),  Sacro ilial pain (M53.3)  Precautions/Allergies: allergic to sulfa antibiotics  Fall Risk Score: 0 (? 5 = High Risk)  MD Orders: Evaluate and treat SI joint pain MEDICAL/REFERRING DIAGNOSIS:  sacro ilial pain    DATE OF ONSET: over a year ago  REFERRING PHYSICIAN: Thien Velazquez MD  RETURN PHYSICIAN APPOINTMENT: None scheduled     INITIAL ASSESSMENT:  Ms. Lorna Villa is a 74year old female with complaints of pain in the L SI area that started over a year ago. She presents with constant pain which does not change much with position, decreased lumbar motion, and positive SI provocation tests. She may benefit from PT to address deficits and work toward goals. As pain has been present for over a year, recovery may be slow. PROBLEM LIST (Impacting functional limitations):  1. Increased Pain  2. Decreased Flexibility/Joint Mobility  3. SI dysfunction INTERVENTIONS PLANNED:  1. Home Exercise Program (HEP)  2. Manual Therapy  3. Therapeutic Exercise/Strengthening  4. Modals as needed   TREATMENT PLAN:  Effective Dates: 17 TO 17. Frequency/Duration: 1-2 times a week for 90 days  GOALS: (Goals have been discussed and agreed upon with patient.)  Patient's stated goal is to stop having pain  Short-Term Functional Goals: Time Frame: 6 weeks  1. Patient to be independent with HEP  2. Patient to report decrease in constant pain levels from 6 or 7, to 3 or 4  Discharge Goals: Time Frame: 90 days  1. Patient to report no more than minimal SI pain with all activity  2.  Patient to improve Oswestry score from 18 to 10 to demo improved functional mobility  Rehabilitation Potential For Stated Goals: [address] [place] [address],  The information in this section was collected on 4/25/17 (except where otherwise noted). HISTORY:   History of Present Injury/Illness (Reason for Referral): Insidious onset of pain in L SI area over a year ago. Sometimes pain goes across abdomen, but not always. Walking does not increase pain. Can sit in firm chair, but not soft ones. Pain may be getting slightly worse since it first started. 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 Current Medications:  Calcium with vitamin D  Date Last Reviewed:  5/15/17   EXAMINATION:   Observation/Orthostatic Postural Assessment:          Stands with increased lumbar lordosis. Palpation:         Pelvic land marks symmetrical in standing. ROM:          Lumbar extension, L and R rotation all 100%. Lumbar flexion about 50%, lateral flexion 75% to L and R  Strength:          WFL  Special Tests:          Positive standing forward bend test on L. Positive Carlton on L. Positive SI compression on L. Neurological Screen:        Sensation: light touch intact in B LEs   Body Structures Involved:  1. Joints  2. Muscles  3. Ligaments Body Functions Affected:  1. Neuromusculoskeletal  2. Movement Related Activities and Participation Affected:  1. Mobility  2. Self Care  3. Community, Social and Civic Life   CLINICAL DECISION MAKING:   Outcome Measure: Tool Used: Modified Oswestry Low Back Pain Questionnaire  Score:  Initial: 18/50  Most Recent: X/50 (Date: -- )   Interpretation of Score: Each section is scored on a 0-5 scale, 5 representing the greatest disability. The scores of each section are added together for a total score of 50. Score 0 1-10 11-20 21-30 31-40 41-49 50   Modifier CH CI CJ CK CL CM CN     ?  Changing and Maintaining Body Position:    [id number] - CURRENT STATUS: CJ - 20%-39% impaired, limited or restricted    - GOAL STATUS: CI - 1%-19% impaired, limited or restricted    - D/C STATUS:  ---------------To be determined---------------    Medical Necessity:   · Patient demonstrates fair rehab potential due to higher previous functional level. Reason for Services/Other Comments:  · Patient continues to require skilled intervention due to need to return to higher level of function. TREATMENT:   (In addition to Assessment/Re-Assessment sessions the following treatments were rendered)  Pre-treatment Symptoms/Complaints:  Patient reports that she did some spring cleaning at home over the weekend and is generally sore all over as a result. Pain: Initial:   Pain Intensity 1: 3   Post Session:  Patient reports no pain by end of session. [place] good. Therapeutic Exercise: (30 Minutes):   Exercises to improve mobility. Required moderate visual and verbal cues to ensure correct performance. Progressed complexity of movement as indicated. Date:  5/2/17 Date:  5/8/17 Date:  5/15/17   Activity/Exercise Parameters Parameters Parameters   SKTC B x 10  B x 10 B x10   SI joint stretch B x 10 B x 10 B x 10   DKTC x5 x5 x5   Supine pelvic tilt x10 1 x10 1x10   bridging 1x10 1x10 2x10   Trunk flex with ball 1x10 2x10 2x10   Standing pelvic tilt x10 1x10 -   Hip abductor squeeze 5 sec x 10 5 sec x 10 5 sec x 10   Hip ER with band   Green 2x10   scap retract with band  Green x 15 Green 2x15   B shldr extn with band  Green x 15 Green 2x15   Seated forward bend - stretch  X 1 only -       Modalities - none today      Treatment/Session Assessment:    · Response to Treatment:  Patient did well with all exercises. She feels she may be able to continue on her own after next week. PT gave updated copy of HEP to patient with band exercises printed off. · Compliance with Program/Exercises: yes  · Recommendations/Intent for next treatment session: \"Next visit will focus on decreasing pain \".   Total Treatment Duration: 30 minutes  PT Patient Time In/Time Out  Time In: 0918  Time Out: Tez Caceres Yes

## 2019-09-24 PROBLEM — Z13.6 SCREENING, ISCHEMIC HEART DISEASE: Status: RESOLVED | Noted: 2018-03-13 | Resolved: 2019-09-24

## 2019-09-24 PROBLEM — Z13.1 SCREENING FOR DIABETES MELLITUS: Status: RESOLVED | Noted: 2018-03-13 | Resolved: 2019-09-24

## 2021-03-29 NOTE — PROGRESS NOTES
Addended by: La Nena Rhodes on: 3/29/2021 11:19 AM     Modules accepted: Orders Torsten Taylor  : 1950 Therapy Center at [facility]  1101 [facility], [address] [place] [address],[address], [address] [facility]  [phone]   [phone]         OUTPATIENT PHYSICAL THERAPY:Daily Note and Discharge 2017    ICD-10: Treatment Diagnosis: Low back pain (M54.5),  Sacro ilial pain (M53.3)  Precautions/Allergies: allergic to sulfa antibiotics  Fall Risk Score: 0 (? 5 = High Risk)  MD Orders: Evaluate and treat SI joint pain  Patient has attended 5 PT sessions from 17 to 17 with no missed sessions MEDICAL/REFERRING DIAGNOSIS:  sacro ilial pain    DATE OF ONSET: over a year ago  REFERRING PHYSICIAN: Ethan Desai MD  RETURN PHYSICIAN APPOINTMENT: None scheduled     INITIAL ASSESSMENT:  Ms. Amelia Alvarez is a 74year old female with complaints of pain in the L SI area that started over a year ago. She presented with constant pain which does not change much with position, decreased lumbar motion, and positive SI provocation tests. She has progressed well and MET all PT goals. Her ROM is improved. She will now be discharged from PT. PROBLEM LIST (Impacting functional limitations):  1. Increased Pain  2. Decreased Flexibility/Joint Mobility  3. SI dysfunction INTERVENTIONS PLANNED:  1. Home Exercise Program (HEP)  2. Manual Therapy  3. Therapeutic Exercise/Strengthening  4. Modals as needed   TREATMENT PLAN:  Discharge PT  GOALS: (Goals have been discussed and agreed upon with patient.)  Patient's stated goal is to stop having pain  Short-Term Functional Goals: Time Frame: 6 weeks  1. Patient to be independent with HEP - MET  2. Patient to report decrease in constant pain levels from 6 or 7, to 3 or 4 - MET (2 on 17)  Discharge Goals: Time Frame: 90 days  1. Patient to report no more than minimal SI pain with all activity - MET (per patient)  2.  Patient to improve Oswestry score from 18 to 10 to demo improved functional mobility MET (8 on 17)      Thank you for this referral,      Ellis Blevins PT                 The information in this section was collected on 4/25/17 (except where otherwise noted). HISTORY:   History of Present Injury/Illness (Reason for Referral): Insidious onset of pain in L SI area over a year ago. Sometimes pain goes across abdomen, but not always. Walking does not increase pain. Can sit in firm chair, but not soft ones. Pain may be getting slightly worse since it first started. 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 Current Medications:  Calcium with vitamin D  Date Last Reviewed:  5/22/17   EXAMINATION:   Observation/Orthostatic Postural Assessment:          Stands with increased lumbar lordosis. Palpation:         Pelvic land marks symmetrical in standing. ROM:          Lumbar extension, L and R rotation all 100%. Lumbar flexion about 50%, lateral flexion 75% to L and R        Lumbar ROM all ~90 to 100% (5/22/17)  Strength:          WFL  Special Tests:          Positive standing forward bend test on L. Positive Carlton on L. Positive SI compression on L. Negative Forward Bend Test (5/22/17)  Neurological Screen:        Sensation: light touch intact in B LEs   Body Structures Involved:  1. Joints  2. Muscles  3. Ligaments Body Functions Affected:  1. Neuromusculoskeletal  2. Movement Related Activities and Participation Affected:  1. Mobility  2. Self Care  3. Community, Social and Civic Life   CLINICAL DECISION MAKING:   Outcome Measure: Tool Used: Modified Oswestry Low Back Pain Questionnaire  Score:  Initial: 18/50  Most Recent: 8/50 (Date: 5/22/17 )   Interpretation of Score: Each section is scored on a 0-5 scale, 5 representing the greatest disability. The scores of each section are added together for a total score of 50.     Score 0 1-10 11-20 21-30 31-40 41-49 50   Modifier CH CI CJ CK CL CM CN     ? Changing and Maintaining Body Position:    [id number] - CURRENT STATUS: CI - 1%-19% impaired, limited or restricted    - GOAL STATUS: CI - 1%-19% impaired, limited or restricted    - D/C STATUS:  CI - 1%-19% impaired, limited or restricted    Medical Necessity:   · Patient demonstrates fair rehab potential due to higher previous functional level. Reason for Services/Other Comments:  · Patient continues to require skilled intervention due to need to return to higher level of function. TREATMENT:   (In addition to Assessment/Re-Assessment sessions the following treatments were rendered)  Pre-treatment Symptoms/Complaints:  Patient reports that she already did her exercises this morning. She would like to close things out quickly because she has a lot to do. She is excited that she can move without pain now. Pain: Initial:   Pain Intensity 1: 2   Post Session:  Patient reports no pain by end of session. Therapeutic Exercise: (15 Minutes):   Verbal review of all exercises since patient is planning to continue doing them on her own now.        Date:  5/2/17 Date:  5/8/17 Date:  5/15/17   Activity/Exercise Parameters Parameters Parameters   SKTC B x 10  B x 10 B x10   SI joint stretch B x 10 B x 10 B x 10   DKTC x5 x5 x5   Supine pelvic tilt x10 1 x10 1x10   bridging 1x10 1x10 2x10   Trunk flex with ball 1x10 2x10 2x10   Standing pelvic tilt x10 1x10 -   Hip abductor squeeze 5 sec x 10 5 sec x 10 5 sec x 10   Hip ER with band   Green 2x10   scap retract with band  Green x 15 Green 2x15   B shldr extn with band  Green x 15 Green 2x15   Seated forward bend - stretch  X 1 only -       Modalities - none today      Treatment/Session Assessment:    · Response to Treatment:  Patient has met all goals and is ready for discharge from PT.   · Compliance with Program/Exercises: yes  Total Treatment Duration: 15 minutes  PT Patient Time In/Time Out  Time In: 2056  Time Out: [place] [address]